# Patient Record
Sex: FEMALE | Race: WHITE
[De-identification: names, ages, dates, MRNs, and addresses within clinical notes are randomized per-mention and may not be internally consistent; named-entity substitution may affect disease eponyms.]

---

## 2017-05-08 ENCOUNTER — HOSPITAL ENCOUNTER (EMERGENCY)
Dept: HOSPITAL 25 - ED | Age: 22
LOS: 1 days | Discharge: HOME | End: 2017-05-09
Payer: COMMERCIAL
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
## 2017-05-08 DIAGNOSIS — S09.90XA: Primary | ICD-10-CM

## 2017-05-08 DIAGNOSIS — F17.210: ICD-10-CM

## 2017-05-08 PROCEDURE — 99281 EMR DPT VST MAYX REQ PHY/QHP: CPT

## 2017-05-09 VITALS — DIASTOLIC BLOOD PRESSURE: 87 MMHG | SYSTOLIC BLOOD PRESSURE: 142 MMHG

## 2017-05-11 NOTE — ED
Head Injury





- HPI Summary


HPI Summary: 





Pt stated that last night she fell down some stone steps and she hit her head 

on the last step.  Pt worked a shift today. Pt denies LOC. Pt states that the 

spot is tender to touch.   Pt states that when she coughs it can be painful (

head).  She denies memory loss, confusion, visual disturbances, focal neuro 

deficits, or abnormal gait.  She denies other health problems.  She arrives 

today to make sure she does not have a concussion or something more serious.  

Denies blood thinners or other medications.  No HA or neck pain.





- History Of Current Complaint


Chief Complaint: EDHeadInjury


Stated Complaint: FALL/HEAD INJURY


Time Seen by Provider: 05/08/17 23:51


Hx Obtained From: Patient


Mechanism Of Injury: Direct Blow, Fall From A Standing Position


Onset/Duration: Started Days Ago


Onset of Pain: Hours


Severity Currently: Mild


Severity Initially: Mild


Pain Intensity: 0


Pain Scale Used: 0-10 Numeric


Location of Head Injury: Occipital


Location: Discrete At: - occipital


Alleviating Factor(s): Rest, Ice





- Risk Factors


SDH Risk Factor: Negative





- Allergies/Home Medications


Allergies/Adverse Reactions: 


 Allergies











Allergy/AdvReac Type Severity Reaction Status Date / Time


 


No Known Allergies Allergy   Verified 09/02/15 07:53














PMH/Surg Hx/FS Hx/Imm Hx


Previously Healthy: Yes





- Immunization History


Hx Pertussis Vaccination: No


Immunizations Up to Date: Unable to Obtain/Confirm


Infectious Disease History: 


   Denies: Traveled Outside the [place] in Last 30 Days





- Family History


Known Family History: Positive: Cardiac Disease





- Social History


Occupation: Employed Full-time


Lives: With Family


Alcohol Use: Daily


Alcohol Amount: 2-3 drins


Hx Substance Use: No


Substance Use Type: Reports: None


Hx Tobacco Use: Yes


Smoking Status (MU): Current Every Day Smoker


Do You Chew or Dip Tobacco: No


Have You Chewed or Dipped Tobacco in the LAST YEAR: No





Review of Systems


Constitutional: Negative


Eyes: Negative


ENT: Negative


Respiratory: Negative


Gastrointestinal: Negative


Musculoskeletal: Negative


Skin: Negative


Positive: Headache - only with cough


All Other Systems Reviewed And Are Negative: Yes





Physical Exam


Triage Information Reviewed: Yes


Vital Signs On Initial Exam: 


 Initial Vitals











Temp Pulse Resp BP Pulse Ox


 


 97.8 F   87   16   142/85   96 


 


 05/08/17 23:02  05/08/17 23:02  05/08/17 23:02  05/08/17 23:02  05/08/17 23:02











Vital Signs Reviewed: Yes


Appearance: Positive: Well-Appearing, No Pain Distress, Well-Nourished


Skin: Positive: Warm, Skin Color Reflects Adequate Perfusion


Eyes: Positive: Normal, BERRY, Conjunctiva Clear


ENT: Positive: TMs normal


Respiratory/Lung Sounds: Positive: Clear to Auscultation, Breath Sounds Present


Cardiovascular: Positive: Pulses are Symmetrical in both Upper and Lower 

Extremities


Musculoskeletal: Positive: Strength/ROM Intact


Neurological: Positive: Sensory/Motor Intact, Alert, Oriented to Person Place, 

Time, CN Intact II-III, Normal Gait, Speech Normal


AVPU Assessment: Alert





- [place] Coma Scale


Best Eye Response: 4 - Spontaneous


Best Motor Response: 6 - Obeys Commands


Best Verbal Response: 5 - Oriented


Coma Scale Total: 15





Diagnostics





- Vital Signs


 Vital Signs











  Temp Pulse Resp BP Pulse Ox


 


 05/09/17 00:17  98 F  87  16  142/87 


 


 05/08/17 23:02  97.8 F  87  16  142/85  96














- Laboratory


Lab Statement: Any lab studies that have been ordered have been reviewed, and 

results considered in the medical decision making process.





Head Injury Course/Dx


Course Of Treatment: Patient arrives 24 hours after falling and hitting head.  

According to [place] CT Head Rules, CT was NOT obtained d/t:  GCS score >15 at 

2h post injury. No suspected open or depressed skull fx, no sign of basal skull 

fx, no hemotympanum, raccoon eyes, Battles sign, CSF radha-/rhinorrhea, no 

emesis after injury, age  <65yo, no amnesia greater than 30 minutes prior to 

trauma, and mechanism of injury was minimal impact with no MVA or fall greater 

than 3 ft.  Complete neuro exam completed and WNL.  Normal head/face inspection 

with no cephalohematoma. Reflexes intact. EOMI, BERRY, visual acuity intact.  No 

obvious confusion or memory loss per patient and family.  MMSE OK.  GCS 15. 

Patient oriented to person, place and date. No obvious deformity or signs of 

trauma.  Finger to nose, heel to toe OK. Speech normal, facial symmetry, normal 

gait, CN II-III intact. Patient denies LOC.  ROM, strength, reflexes in upper 

and lower extremity intact, sensation intact.  Patient discharged with return 

precautions and post-concussive symptoms explained to patient.  Patient agrees 

to follow up and return if needed.





- Diagnoses


Differential Diagnosis/HQI/PQRI: Cervical Sprain, Concussion With LOC, 

Concussion Without LOC, Contusion


Provider Diagnoses: 


 Head injury








Discharge





- Discharge Plan


Condition: Stable


Disposition: HOME


Patient Education Materials:  Head Injury (ED)


Referrals: 


Leland Davis MD [Primary Care Provider] - 


Additional Instructions: 


Follow up this week with your doctor.


If you develop any memory loss, confusion, worsening HA or visual disturbances, 

come back to ED immediately.


Brain rest for 24 hours.

## 2018-02-17 ENCOUNTER — HOSPITAL ENCOUNTER (EMERGENCY)
Dept: HOSPITAL 25 - ED | Age: 23
Discharge: HOME | End: 2018-02-17
Payer: COMMERCIAL

## 2018-02-17 VITALS — SYSTOLIC BLOOD PRESSURE: 143 MMHG | DIASTOLIC BLOOD PRESSURE: 76 MMHG

## 2018-02-17 DIAGNOSIS — F17.200: ICD-10-CM

## 2018-02-17 DIAGNOSIS — J11.1: Primary | ICD-10-CM

## 2018-02-17 PROCEDURE — 99282 EMERGENCY DEPT VISIT SF MDM: CPT

## 2018-02-17 NOTE — ED
Influenza-Like Illness





- HPI Summary


HPI Summary: 


22-year-old female presents with nausea and vomiting and cough for the past 

day.  She states she has been having muscle aches and fevers.  She admits to 

sore throat and sinus congestion.  She admits to generalized abdominal pain.  

She denies any diarrhea.  She denies any chest pain or shortness of breath.  

She has no medical conditions.  She has tried some Tylenol with minimal relief.

  She states she has not been able to eat anything due to the vomiting.  She 

denies anyone else being sick. 





- History of Current Complaint


Chief Complaint: EDFluSymptoms


Time Seen by Provider: 02/17/18 01:52





- Allergy/Home Medications


Allergies/Adverse Reactions: 


 Allergies











Allergy/AdvReac Type Severity Reaction Status Date / Time


 


No Known Allergies Allergy   Verified 09/02/15 07:53














PMH/Surg Hx/FS Hx/Imm Hx


Endocrine/Hematology History: 


   Denies: Hx Anticoagulant Therapy


Cardiovascular History: 


   Denies: Hx Myocardial Infarction


Infectious Disease History: No


Infectious Disease History: 


   Denies: Traveled Outside the [place] in Last 30 Days





- Family History


Known Family History: Positive: Cardiac Disease





- Social History


Alcohol Use: Daily


Alcohol Amount: 2-3 drins


Hx Substance Use: No


Substance Use Type: Reports: None


Hx Tobacco Use: Yes


Smoking Status (MU): Current Every Day Smoker





Review of Systems


Positive: Fever, Fatigue


Positive: Sore Throat, Nasal Discharge


Negative: Chest Pain


Positive: Cough.  Negative: Shortness Of Breath


Positive: Vomiting, Nausea


All Other Systems Reviewed And Are Negative: Yes





Physical Exam


Triage Information Reviewed: Yes


Vital Signs On Initial Exam: 


 Initial Vitals











Temp Pulse Resp BP Pulse Ox


 


 97 F   118   22   143/76   98 


 


 02/17/18 01:48  02/17/18 01:48  02/17/18 01:48  02/17/18 01:48  02/17/18 01:48











Vital Signs Reviewed: Yes


Appearance: Positive: Ill-Appearing


Skin: Positive: Warm, Dry


Head/Face: Positive: Normal Head/Face Inspection


Eyes: Positive: Normal, EOMI, BERRY, Conjunctiva Clear


ENT: Positive: Pharyngeal erythema, TMs normal, Uvula midline, Other - softr 

palate symmetric.  Negative: Tonsillar swelling, Tonsillar exudate, Trismus, 

Muffled voice


Neck: Positive: Supple, Nontender, No Lymphadenopathy.  Negative: Nuchal 

Rigidity


Respiratory/Lung Sounds: Positive: Clear to Auscultation, Breath Sounds Present


Cardiovascular: Positive: Normal, RRR


Abdomen Description: Positive: Nontender, Soft


Bowel Sounds: Positive: Present


Musculoskeletal: Positive: Normal


Neurological: Positive: Normal


Psychiatric: Positive: Normal





Diagnostics





- Vital Signs


 Vital Signs











  Temp Pulse Resp BP Pulse Ox


 


 02/17/18 01:48  97 F  118  22  143/76  98














- Laboratory


Lab Statement: Any lab studies that have been ordered have been reviewed, and 

results considered in the medical decision making process.





Flu Symptom Course/Dx





- Course


Course Of Treatment: 22-year-old female presents with nausea and vomiting and 

cough for the past day.  She states she has been having muscle aches and 

fevers.  She admits to sore throat and sinus congestion.  She admits to 

generalized abdominal pain.  She denies any diarrhea.  She denies any chest 

pain or shortness of breath.  She has no medical conditions.  She has tried 

some Tylenol with minimal relief.  She states she has not been able to eat 

anything due to the vomiting.  She denies anyone else being sick.  on exam 

lungs CTA. abdomen soft nontender. pharynx erythematous, uvula midline, soft 

palate symmetric. will treat persumpatively as flu. gave zofran and tamiflu. 

patient understand and agrees with plan.





- Diagnoses


Differential Diagnosis/HQI/PQRI: Positive: Influenza, Pneumonia, Upper 

Respiratory Infection


Provider Diagnoses: 


 Influenza








Discharge





- Discharge Plan


Condition: Good


Disposition: HOME


Prescriptions: 


Ondansetron ODT TAB* [Zofran 4 MG Odt TAB*] 4 mg PO Q6H PRN #20 tab.odt


 PRN Reason: Nausea


Oseltamivir CAP* [Tamiflu CAP*] 75 mg PO BID #9 cap


Patient Education Materials:  Influenza (ED)


Referrals: 


Leland Davis MD [Primary Care Provider] - 


Additional Instructions: 


Symptoms likely due to the flu


Take Tamiflu twice for 5 days first dose given in ED


Take Tylenol and ibuprofen for muscle aches and fever every 6 hours


Use Zofran up two tablets every 6 hours for nausea as needed


Saline rinse can be used multiple times a day for nasal congestion


Use humidifier in room or place bowls of warm water around room for cough


Try to drink fluids every hour and eat a small snack every 3 hours


Follow up with primary within 5 days


Return to ED if develop any new or worsening symptoms